# Patient Record
Sex: MALE | Race: WHITE | NOT HISPANIC OR LATINO | URBAN - METROPOLITAN AREA
[De-identification: names, ages, dates, MRNs, and addresses within clinical notes are randomized per-mention and may not be internally consistent; named-entity substitution may affect disease eponyms.]

---

## 2024-03-14 DIAGNOSIS — R56.9 SEIZURE (MULTI): Primary | ICD-10-CM

## 2024-03-14 RX ORDER — CLONAZEPAM 0.5 MG/1
0.5 TABLET ORAL NIGHTLY
Qty: 30 TABLET | Refills: 2 | Status: SHIPPED | OUTPATIENT
Start: 2024-03-14 | End: 2024-04-16

## 2024-03-14 RX ORDER — OXCARBAZEPINE 600 MG/1
600 TABLET, FILM COATED ORAL 2 TIMES DAILY
Qty: 60 TABLET | Refills: 11 | Status: SHIPPED | OUTPATIENT
Start: 2024-03-14 | End: 2024-04-16

## 2024-03-14 RX ORDER — OXCARBAZEPINE 600 MG/1
1 TABLET, FILM COATED ORAL 2 TIMES DAILY
COMMUNITY
End: 2024-03-14 | Stop reason: SDUPTHER

## 2024-03-14 RX ORDER — CLONAZEPAM 0.5 MG/1
1 TABLET ORAL NIGHTLY
COMMUNITY
Start: 2017-10-03 | End: 2024-03-14 | Stop reason: SDUPTHER

## 2024-04-14 DIAGNOSIS — R56.9 SEIZURE (MULTI): ICD-10-CM

## 2024-04-16 RX ORDER — CLONAZEPAM 0.5 MG/1
0.5 TABLET ORAL NIGHTLY
Qty: 90 TABLET | Refills: 0 | Status: SHIPPED | OUTPATIENT
Start: 2024-04-16 | End: 2024-07-15

## 2024-04-16 RX ORDER — OXCARBAZEPINE 600 MG/1
600 TABLET, FILM COATED ORAL 2 TIMES DAILY
Qty: 180 TABLET | Refills: 3 | Status: SHIPPED | OUTPATIENT
Start: 2024-04-16 | End: 2025-04-16

## 2024-07-25 ENCOUNTER — APPOINTMENT (OUTPATIENT)
Dept: SURGERY | Facility: CLINIC | Age: 44
End: 2024-07-25
Payer: COMMERCIAL

## 2024-07-25 VITALS
WEIGHT: 143 LBS | DIASTOLIC BLOOD PRESSURE: 70 MMHG | BODY MASS INDEX: 22.98 KG/M2 | HEART RATE: 76 BPM | HEIGHT: 66 IN | TEMPERATURE: 98.1 F | SYSTOLIC BLOOD PRESSURE: 117 MMHG

## 2024-07-25 DIAGNOSIS — K40.20 NON-RECURRENT BILATERAL INGUINAL HERNIA WITHOUT OBSTRUCTION OR GANGRENE: Primary | ICD-10-CM

## 2024-07-25 DIAGNOSIS — K40.20 BILATERAL INGUINAL HERNIA WITHOUT OBSTRUCTION OR GANGRENE, RECURRENCE NOT SPECIFIED: ICD-10-CM

## 2024-07-25 PROCEDURE — 99203 OFFICE O/P NEW LOW 30 MIN: CPT | Performed by: SURGERY

## 2024-07-25 PROCEDURE — 3008F BODY MASS INDEX DOCD: CPT | Performed by: SURGERY

## 2024-07-25 PROCEDURE — 1036F TOBACCO NON-USER: CPT | Performed by: SURGERY

## 2024-07-25 RX ORDER — CLONAZEPAM 0.5 MG/1
TABLET ORAL DAILY
COMMUNITY

## 2024-07-25 ASSESSMENT — PAIN SCALES - GENERAL: PAINLEVEL: 2

## 2024-07-25 NOTE — PROGRESS NOTES
"History Of Present Illness  Jesus Tafoya is a 43 y.o. male presenting with bilateral inguinal hernias.  He works in [employer].  He had previous surgery for epilepsy which has worked quite well.  He had an ablation therapy.  He had no other abdominal surgeries.  He notes the hernia is enlarging at times.        Last Recorded Vitals  Blood pressure 117/70, pulse 76, temperature 36.7 °C (98.1 °F), height 1.676 m (5' 6\"), weight 64.9 kg (143 lb).  Physical Examination  Awake and alert.  Normal respiration.  Abdominal exam no scars.  He has small bilateral inguinal hernias      Assessment/Plan bilateral inguinal hernias.  I reviewed the hernia booklets with him.  Will plan a laparoscopic bilateral inguinal hernia pair at his convenience.  Will probably plan for this in December when his season is done.    Viet Maradiaga MD FACS  Professor of Surgery  Graeme Diez Chair in Surgical [place]  [facility]  [address] [place] [address], [address]  Phone [phone]  email: [email or web address]        "

## 2024-09-16 DIAGNOSIS — R56.9 SEIZURE (MULTI): ICD-10-CM

## 2024-09-17 RX ORDER — CLONAZEPAM 0.5 MG/1
0.5 TABLET ORAL NIGHTLY
Qty: 90 TABLET | Refills: 0 | Status: SHIPPED | OUTPATIENT
Start: 2024-09-17

## 2025-01-06 ENCOUNTER — ANESTHESIA EVENT (OUTPATIENT)
Dept: OPERATING ROOM | Facility: HOSPITAL | Age: 45
End: 2025-01-06
Payer: COMMERCIAL

## 2025-01-06 NOTE — ANESTHESIA PREPROCEDURE EVALUATION
"Patient: Jesus Tafoya    Procedure Information       Date/Time: 01/07/25 0830    Procedure: Laparoscopic Bilateral  Inguinal Hernia Repair (Bilateral)    Location: [facility] A OR 09 / Virtual [facility] A OR    Surgeons: Viet Maradiaga MD                                                         Pre- Anesthesia Evaluation                                            Jesus Tafoya is a 44 y.o. male who presents for the above mentioned procedure due to Bilateral inguinal hernia without obstruction or gangrene, recurrence not specified [K40.20];Non-recurrent bilateral inguinal hernia without obstruction or gangrene [K40.20]    Past Medical History:   Diagnosis Date    Personal history of other specified conditions 05/04/2017    History of seizure     Past Surgical History:   Procedure Laterality Date    BRAIN SURGERY      CT HEAD ANGIO W AND WO IV CONTRAST  06/09/2017    CT HEAD ANGIO W AND WO IV CONTRAST 6/9/2017 [facility] ANCILLARY LEGACY     Social History   He reports that he has quit smoking. His smoking use included cigarettes. He started smoking about 20 years ago. He has a 6 pack-year smoking history. He has been exposed to tobacco smoke. He uses smokeless tobacco. He reports current alcohol use. He reports current drug use. Drug: Marijuana.    Allergies and Medications   No Known Allergies       Current Outpatient Medications   Medication Instructions    clonazePAM (KlonoPIN) 0.5 mg tablet Daily    clonazePAM (KLONOPIN) 0.5 mg, oral, Nightly    OXcarbazepine (TRILEPTAL) 600 mg, oral, 2 times daily       Recent Labs     11/06/18  1050 11/03/17  1337   ANIONGAP 10 12   BUN 12 12   CREATININE 0.91 0.87    139   K 4.2 3.9   CL 97* 102   CO2 33* 29   GLUCOSE 110* 106*       Visit Vitals  /87   Pulse 68   Temp 36.3 °C (97.3 °F) (Temporal)   Resp 15   Ht 1.676 m (5' 6\")   Wt 66.7 kg (147 lb 0.8 oz)   SpO2 98%   BMI 23.73 kg/m²   Smoking Status Former   BSA 1.76 m²     Medical Gas Therapy: None (Room air)             Relevant " Problems   Neuro   (+) Complex partial epilepsy, intractable (Multi) (Resolved)       Clinical information reviewed:   Tobacco  Allergies  Meds   Med Hx  Surg Hx   Fam Hx  Soc Hx        NPO Detail:  NPO/Void Status  Date of Last Liquid: 01/07/25  Time of Last Liquid: 0600  Date of Last Solid: 01/06/25  Time of Last Solid: 2200  Last Intake Type: Clear fluids         Physical Exam    Airway  Mallampati: II  TM distance: >3 FB  Neck ROM: full     Cardiovascular   Rhythm: regular  Rate: normal     Dental - normal exam     Pulmonary   Comments: Normal RR  Non-labored respiration    Abdominal            Anesthesia Plan    History of general anesthesia?: yes  History of complications of general anesthesia?: no    ASA 2     general   (GETA with standard ASA monitoring)  intravenous induction   Postoperative administration of opioids is intended.  Anesthetic plan and risks discussed with patient.    Plan discussed with CAA and CRNA.

## 2025-01-07 ENCOUNTER — HOSPITAL ENCOUNTER (OUTPATIENT)
Dept: CARDIOLOGY | Facility: HOSPITAL | Age: 45
Discharge: HOME | End: 2025-01-07
Payer: COMMERCIAL

## 2025-01-07 ENCOUNTER — ANESTHESIA (OUTPATIENT)
Dept: OPERATING ROOM | Facility: HOSPITAL | Age: 45
End: 2025-01-07
Payer: COMMERCIAL

## 2025-01-07 ENCOUNTER — HOSPITAL ENCOUNTER (OUTPATIENT)
Facility: HOSPITAL | Age: 45
Setting detail: OUTPATIENT SURGERY
Discharge: HOME | End: 2025-01-07
Attending: SURGERY | Admitting: SURGERY
Payer: COMMERCIAL

## 2025-01-07 VITALS
TEMPERATURE: 96.8 F | DIASTOLIC BLOOD PRESSURE: 68 MMHG | WEIGHT: 147.05 LBS | SYSTOLIC BLOOD PRESSURE: 103 MMHG | BODY MASS INDEX: 23.63 KG/M2 | OXYGEN SATURATION: 94 % | HEART RATE: 60 BPM | HEIGHT: 66 IN | RESPIRATION RATE: 11 BRPM

## 2025-01-07 DIAGNOSIS — K40.20 NON-RECURRENT BILATERAL INGUINAL HERNIA WITHOUT OBSTRUCTION OR GANGRENE: Primary | ICD-10-CM

## 2025-01-07 DIAGNOSIS — K40.20 BILATERAL INGUINAL HERNIA WITHOUT OBSTRUCTION OR GANGRENE, RECURRENCE NOT SPECIFIED: ICD-10-CM

## 2025-01-07 PROBLEM — G40.219 COMPLEX PARTIAL EPILEPSY, INTRACTABLE (MULTI): Status: RESOLVED | Noted: 2025-01-07 | Resolved: 2025-01-07

## 2025-01-07 PROBLEM — G40.219 COMPLEX PARTIAL EPILEPSY, INTRACTABLE (MULTI): Status: ACTIVE | Noted: 2025-01-07

## 2025-01-07 PROCEDURE — 2500000004 HC RX 250 GENERAL PHARMACY W/ HCPCS (ALT 636 FOR OP/ED): Performed by: ANESTHESIOLOGIST ASSISTANT

## 2025-01-07 PROCEDURE — 3700000002 HC GENERAL ANESTHESIA TIME - EACH INCREMENTAL 1 MINUTE: Performed by: SURGERY

## 2025-01-07 PROCEDURE — 3600000008 HC OR TIME - EACH INCREMENTAL 1 MINUTE - PROCEDURE LEVEL THREE: Performed by: SURGERY

## 2025-01-07 PROCEDURE — 2500000004 HC RX 250 GENERAL PHARMACY W/ HCPCS (ALT 636 FOR OP/ED): Performed by: SURGERY

## 2025-01-07 PROCEDURE — 7100000010 HC PHASE TWO TIME - EACH INCREMENTAL 1 MINUTE: Performed by: SURGERY

## 2025-01-07 PROCEDURE — 2720000007 HC OR 272 NO HCPCS: Performed by: SURGERY

## 2025-01-07 PROCEDURE — A49650 PR LAP,INGUINAL HERNIA REPR,INITIAL: Performed by: ANESTHESIOLOGIST ASSISTANT

## 2025-01-07 PROCEDURE — C1781 MESH (IMPLANTABLE): HCPCS | Performed by: SURGERY

## 2025-01-07 PROCEDURE — 7100000001 HC RECOVERY ROOM TIME - INITIAL BASE CHARGE: Performed by: SURGERY

## 2025-01-07 PROCEDURE — 2500000001 HC RX 250 WO HCPCS SELF ADMINISTERED DRUGS (ALT 637 FOR MEDICARE OP): Performed by: ANESTHESIOLOGY

## 2025-01-07 PROCEDURE — 2500000005 HC RX 250 GENERAL PHARMACY W/O HCPCS: Performed by: SURGERY

## 2025-01-07 PROCEDURE — 49650 LAP ING HERNIA REPAIR INIT: CPT | Performed by: SURGERY

## 2025-01-07 PROCEDURE — 7100000009 HC PHASE TWO TIME - INITIAL BASE CHARGE: Performed by: SURGERY

## 2025-01-07 PROCEDURE — 2500000004 HC RX 250 GENERAL PHARMACY W/ HCPCS (ALT 636 FOR OP/ED): Performed by: ANESTHESIOLOGY

## 2025-01-07 PROCEDURE — 3700000001 HC GENERAL ANESTHESIA TIME - INITIAL BASE CHARGE: Performed by: SURGERY

## 2025-01-07 PROCEDURE — 93005 ELECTROCARDIOGRAM TRACING: CPT

## 2025-01-07 PROCEDURE — 7100000002 HC RECOVERY ROOM TIME - EACH INCREMENTAL 1 MINUTE: Performed by: SURGERY

## 2025-01-07 PROCEDURE — 2500000005 HC RX 250 GENERAL PHARMACY W/O HCPCS: Performed by: ANESTHESIOLOGY

## 2025-01-07 PROCEDURE — 93010 ELECTROCARDIOGRAM REPORT: CPT | Performed by: INTERNAL MEDICINE

## 2025-01-07 PROCEDURE — 3600000003 HC OR TIME - INITIAL BASE CHARGE - PROCEDURE LEVEL THREE: Performed by: SURGERY

## 2025-01-07 PROCEDURE — A49650 PR LAP,INGUINAL HERNIA REPR,INITIAL: Performed by: ANESTHESIOLOGY

## 2025-01-07 PROCEDURE — 2780000003 HC OR 278 NO HCPCS: Performed by: SURGERY

## 2025-01-07 DEVICE — BARD MESH, 6" X 6" (15 CM X 15 CM)
Type: IMPLANTABLE DEVICE | Site: INGUINAL | Status: FUNCTIONAL
Brand: BARD

## 2025-01-07 RX ORDER — PROCHLORPERAZINE EDISYLATE 5 MG/ML
5 INJECTION INTRAMUSCULAR; INTRAVENOUS ONCE AS NEEDED
Status: DISCONTINUED | OUTPATIENT
Start: 2025-01-07 | End: 2025-01-07 | Stop reason: HOSPADM

## 2025-01-07 RX ORDER — MIDAZOLAM HYDROCHLORIDE 1 MG/ML
INJECTION INTRAMUSCULAR; INTRAVENOUS AS NEEDED
Status: DISCONTINUED | OUTPATIENT
Start: 2025-01-07 | End: 2025-01-07

## 2025-01-07 RX ORDER — OXYCODONE HYDROCHLORIDE 5 MG/1
5 TABLET ORAL
Status: DISCONTINUED | OUTPATIENT
Start: 2025-01-07 | End: 2025-01-07 | Stop reason: HOSPADM

## 2025-01-07 RX ORDER — PROPOFOL 10 MG/ML
INJECTION, EMULSION INTRAVENOUS AS NEEDED
Status: DISCONTINUED | OUTPATIENT
Start: 2025-01-07 | End: 2025-01-07

## 2025-01-07 RX ORDER — OXYCODONE HYDROCHLORIDE 5 MG/1
5 TABLET ORAL EVERY 6 HOURS PRN
Qty: 7 TABLET | Refills: 0 | Status: SHIPPED | OUTPATIENT
Start: 2025-01-07 | End: 2025-01-10

## 2025-01-07 RX ORDER — DROPERIDOL 2.5 MG/ML
0.62 INJECTION, SOLUTION INTRAMUSCULAR; INTRAVENOUS ONCE AS NEEDED
Status: DISCONTINUED | OUTPATIENT
Start: 2025-01-07 | End: 2025-01-07 | Stop reason: HOSPADM

## 2025-01-07 RX ORDER — ONDANSETRON HYDROCHLORIDE 2 MG/ML
4 INJECTION, SOLUTION INTRAVENOUS ONCE AS NEEDED
Status: DISCONTINUED | OUTPATIENT
Start: 2025-01-07 | End: 2025-01-07 | Stop reason: HOSPADM

## 2025-01-07 RX ORDER — BUPIVACAINE HYDROCHLORIDE 5 MG/ML
INJECTION, SOLUTION PERINEURAL AS NEEDED
Status: DISCONTINUED | OUTPATIENT
Start: 2025-01-07 | End: 2025-01-07 | Stop reason: HOSPADM

## 2025-01-07 RX ORDER — FENTANYL CITRATE 50 UG/ML
INJECTION, SOLUTION INTRAMUSCULAR; INTRAVENOUS AS NEEDED
Status: DISCONTINUED | OUTPATIENT
Start: 2025-01-07 | End: 2025-01-07

## 2025-01-07 RX ORDER — KETOROLAC TROMETHAMINE 30 MG/ML
INJECTION, SOLUTION INTRAMUSCULAR; INTRAVENOUS AS NEEDED
Status: DISCONTINUED | OUTPATIENT
Start: 2025-01-07 | End: 2025-01-07

## 2025-01-07 RX ORDER — SODIUM CHLORIDE, SODIUM LACTATE, POTASSIUM CHLORIDE, CALCIUM CHLORIDE 600; 310; 30; 20 MG/100ML; MG/100ML; MG/100ML; MG/100ML
100 INJECTION, SOLUTION INTRAVENOUS CONTINUOUS
Status: DISCONTINUED | OUTPATIENT
Start: 2025-01-07 | End: 2025-01-07 | Stop reason: HOSPADM

## 2025-01-07 RX ORDER — HYDRALAZINE HYDROCHLORIDE 20 MG/ML
5 INJECTION INTRAMUSCULAR; INTRAVENOUS EVERY 30 MIN PRN
Status: DISCONTINUED | OUTPATIENT
Start: 2025-01-07 | End: 2025-01-07 | Stop reason: HOSPADM

## 2025-01-07 RX ORDER — ROCURONIUM BROMIDE 10 MG/ML
INJECTION, SOLUTION INTRAVENOUS AS NEEDED
Status: DISCONTINUED | OUTPATIENT
Start: 2025-01-07 | End: 2025-01-07

## 2025-01-07 RX ORDER — LIDOCAINE HYDROCHLORIDE 20 MG/ML
INJECTION, SOLUTION INFILTRATION; PERINEURAL AS NEEDED
Status: DISCONTINUED | OUTPATIENT
Start: 2025-01-07 | End: 2025-01-07

## 2025-01-07 RX ORDER — SODIUM CHLORIDE 0.9 G/100ML
IRRIGANT IRRIGATION AS NEEDED
Status: DISCONTINUED | OUTPATIENT
Start: 2025-01-07 | End: 2025-01-07 | Stop reason: HOSPADM

## 2025-01-07 RX ORDER — ONDANSETRON HYDROCHLORIDE 2 MG/ML
INJECTION, SOLUTION INTRAVENOUS AS NEEDED
Status: DISCONTINUED | OUTPATIENT
Start: 2025-01-07 | End: 2025-01-07

## 2025-01-07 RX ORDER — CEFAZOLIN 1 G/1
INJECTION, POWDER, FOR SOLUTION INTRAVENOUS AS NEEDED
Status: DISCONTINUED | OUTPATIENT
Start: 2025-01-07 | End: 2025-01-07

## 2025-01-07 RX ADMIN — LIDOCAINE HYDROCHLORIDE 100 MG: 20 INJECTION, SOLUTION INFILTRATION; PERINEURAL at 08:29

## 2025-01-07 RX ADMIN — OXYCODONE HYDROCHLORIDE 5 MG: 5 TABLET ORAL at 10:00

## 2025-01-07 RX ADMIN — Medication 6 L/MIN: at 09:23

## 2025-01-07 RX ADMIN — MIDAZOLAM HYDROCHLORIDE 2 MG: 1 INJECTION, SOLUTION INTRAMUSCULAR; INTRAVENOUS at 08:24

## 2025-01-07 RX ADMIN — DEXAMETHASONE SODIUM PHOSPHATE 8 MG: 4 INJECTION, SOLUTION INTRAMUSCULAR; INTRAVENOUS at 08:39

## 2025-01-07 RX ADMIN — FENTANYL CITRATE 50 MCG: 50 INJECTION, SOLUTION INTRAMUSCULAR; INTRAVENOUS at 08:36

## 2025-01-07 RX ADMIN — SODIUM CHLORIDE, POTASSIUM CHLORIDE, SODIUM LACTATE AND CALCIUM CHLORIDE: 600; 310; 30; 20 INJECTION, SOLUTION INTRAVENOUS at 08:20

## 2025-01-07 RX ADMIN — CEFAZOLIN 2 G: 1 INJECTION, POWDER, FOR SOLUTION INTRAMUSCULAR; INTRAVENOUS at 08:34

## 2025-01-07 RX ADMIN — HYDROMORPHONE HYDROCHLORIDE 0.5 MG: 1 INJECTION, SOLUTION INTRAMUSCULAR; INTRAVENOUS; SUBCUTANEOUS at 09:38

## 2025-01-07 RX ADMIN — ROCURONIUM BROMIDE 40 MG: 10 INJECTION, SOLUTION INTRAVENOUS at 08:30

## 2025-01-07 RX ADMIN — KETOROLAC TROMETHAMINE 30 MG: 30 INJECTION, SOLUTION INTRAMUSCULAR at 09:14

## 2025-01-07 RX ADMIN — FENTANYL CITRATE 50 MCG: 50 INJECTION, SOLUTION INTRAMUSCULAR; INTRAVENOUS at 08:29

## 2025-01-07 RX ADMIN — PROPOFOL 200 MG: 10 INJECTION, EMULSION INTRAVENOUS at 08:29

## 2025-01-07 RX ADMIN — ONDANSETRON 4 MG: 2 INJECTION, SOLUTION INTRAMUSCULAR; INTRAVENOUS at 09:14

## 2025-01-07 ASSESSMENT — PAIN - FUNCTIONAL ASSESSMENT
PAIN_FUNCTIONAL_ASSESSMENT: 0-10
PAIN_FUNCTIONAL_ASSESSMENT: UNABLE TO SELF-REPORT
PAIN_FUNCTIONAL_ASSESSMENT: 0-10
PAIN_FUNCTIONAL_ASSESSMENT: 0-10

## 2025-01-07 ASSESSMENT — PAIN SCALES - GENERAL
PAINLEVEL_OUTOF10: 0 - NO PAIN
PAINLEVEL_OUTOF10: 7
PAINLEVEL_OUTOF10: 3
PAINLEVEL_OUTOF10: 0 - NO PAIN

## 2025-01-07 ASSESSMENT — COLUMBIA-SUICIDE SEVERITY RATING SCALE - C-SSRS
6. HAVE YOU EVER DONE ANYTHING, STARTED TO DO ANYTHING, OR PREPARED TO DO ANYTHING TO END YOUR LIFE?: NO
2. HAVE YOU ACTUALLY HAD ANY THOUGHTS OF KILLING YOURSELF?: NO
1. IN THE PAST MONTH, HAVE YOU WISHED YOU WERE DEAD OR WISHED YOU COULD GO TO SLEEP AND NOT WAKE UP?: NO

## 2025-01-07 ASSESSMENT — PAIN DESCRIPTION - DESCRIPTORS
DESCRIPTORS: SORE
DESCRIPTORS: SORE

## 2025-01-07 NOTE — ANESTHESIA PROCEDURE NOTES
Airway  Date/Time: 1/7/2025 8:32 AM  Urgency: elective    Airway not difficult    Staffing  Performed: WILDA   Authorized by: Lenka Last MD    Performed by: WILDA Davis  Patient location during procedure: OR    Indications and Patient Condition  Indications for airway management: anesthesia  Spontaneous ventilation: present  Sedation level: deep  Preoxygenated: yes  Patient position: sniffing  Mask difficulty assessment: 1 - vent by mask    Final Airway Details  Final airway type: endotracheal airway      Successful airway: ETT  Cuffed: yes   Successful intubation technique: direct laryngoscopy  Facilitating devices/methods: intubating stylet and cricoid pressure  Endotracheal tube insertion site: oral  Blade: Chelsie  Blade size: #4  ETT size (mm): 7.5  Cormack-Lehane Classification: grade IIa - partial view of glottis  Placement verified by: chest auscultation and capnometry   Measured from: teeth  ETT to teeth (cm): 23  Number of attempts at approach: 1

## 2025-01-07 NOTE — BRIEF OP NOTE
Date: 2025  OR Location: [facility] OR    Name: Jesus Tafoya, : 1980, Age: 44 y.o., MRN: [id number], Sex: male    Diagnosis  Pre-op Diagnosis      * Bilateral inguinal hernia without obstruction or gangrene, recurrence not specified [K40.20] Post-op Diagnosis     * Bilateral inguinal hernia without obstruction or gangrene, recurrence not specified [K40.20]     Procedures  Laparoscopic Bilateral  Inguinal Hernia Repair  93068 - OR LAPAROSCOPY SURG RPR INITIAL INGUINAL HERNIA      Surgeons      * Viet Maradiaga - Primary    Resident/Fellow/Other Assistant:  Surgeons and Role:  * No surgeons found with a matching role *    Staff:   Circulator: Latasha  Scrub Person: Shawn Sweeney Scrub: Heidy Sweeney Circulator: Suellen    Anesthesia Staff: Anesthesiologist: Lenka Last MD  C-AA: WILDA Davis    Procedure Summary  Anesthesia: General  ASA: II  Estimated Blood Loss: 2mL  Intra-op Medications:   Administrations occurring from 0830 to 1000 on 25:   Medication Name Total Dose   BUPivacaine HCl (Marcaine) 0.5 % (5 mg/mL) injection 13 mL   sodium chloride 0.9 % irrigation solution 1,000 mL   ceFAZolin (Ancef) vial 1 g 2 g   dexAMETHasone (Decadron) injection 4 mg/mL 8 mg   fentaNYL (Sublimaze) injection 50 mcg/mL 50 mcg   ketorolac (Toradol) injection 30 mg 30 mg   ondansetron (Zofran) 2 mg/mL injection 4 mg   rocuronium (ZeMuron) 50 mg/5 mL injection 40 mg              Anesthesia Record               Intraprocedure I/O Totals       None           Specimen: No specimens collected               Findings: bilateral indirect inguinal hernia     Complications:  None; patient tolerated the procedure well.     Disposition: PACU - hemodynamically stable.  Condition: stable  Specimens Collected: No specimens collected  Attending Attestation: I was present and scrubbed for the entire procedure.    Viet Maradiaga  Phone Number: [phone]

## 2025-01-07 NOTE — ANESTHESIA POSTPROCEDURE EVALUATION
Patient: Jesus Tafoya    Procedure Summary       Date: 01/07/25 Room / Location: [facility] A OR 09 / Virtual [facility] OR    Anesthesia Start: 0820 Anesthesia Stop: 0928    Procedure: Laparoscopic Bilateral  Inguinal Hernia Repair (Bilateral) Diagnosis:       Bilateral inguinal hernia without obstruction or gangrene, recurrence not specified      (Bilateral inguinal hernia without obstruction or gangrene, recurrence not specified [K40.20])    Surgeons: Viet Maradiaga MD Responsible Provider: Lenka Last MD    Anesthesia Type: general ASA Status: 2            Anesthesia Type: general    Vitals Value Taken Time   /63 01/07/25 0930   Temp 36 °C (96.8 °F) 01/07/25 0923   Pulse 80 01/07/25 0930   Resp 13 01/07/25 0930   SpO2 96 % 01/07/25 0930       Anesthesia Post Evaluation    Patient location during evaluation: PACU  Patient participation: complete - patient participated  Level of consciousness: awake  Pain management: adequate  Multimodal analgesia pain management approach  Airway patency: patent  Cardiovascular status: hemodynamically stable  Respiratory status: spontaneous ventilation  Hydration status: euvolemic  Postoperative Nausea and Vomiting: none        No notable events documented.

## 2025-01-07 NOTE — OP NOTE
Laparoscopic Bilateral  Inguinal Hernia Repair (B) Operative Note     Date: 2025  OR Location: [facility] A OR    Name: Jesus Tafoya, : 1980, Age: 44 y.o., MRN: [id number], Sex: male    Diagnosis  Pre-op Diagnosis      * Bilateral inguinal hernia without obstruction or gangrene, recurrence not specified [K40.20] Post-op Diagnosis     * Bilateral inguinal hernia without obstruction or gangrene, recurrence not specified [K40.20]     Procedures  Laparoscopic Bilateral  Inguinal Hernia Repair  83015 - CT LAPAROSCOPY SURG RPR INITIAL INGUINAL HERNIA      Surgeons      * [facility]    Resident/Fellow/Other Assistant:  Surgeons and Role:  * No surgeons found with a matching role *    Staff:   Circulator: Latasha Tinocoub Person: Shawn Sweeney Scrub: Heidy Sweeney Circulator: Suellen    Anesthesia Staff: Anesthesiologist: Lenka Last MD  C-AA: WILDA Davis    Procedure Summary  Anesthesia: General  ASA: II  Estimated Blood Loss: 5mL  Intra-op Medications:   Administrations occurring from 0830 to 1000 on 25:   Medication Name Total Dose   BUPivacaine HCl (Marcaine) 0.5 % (5 mg/mL) injection 13 mL   sodium chloride 0.9 % irrigation solution 1,000 mL   oxygen (O2) therapy 36 L   ceFAZolin (Ancef) vial 1 g 2 g   dexAMETHasone (Decadron) injection 4 mg/mL 8 mg   fentaNYL (Sublimaze) injection 50 mcg/mL 50 mcg   ketorolac (Toradol) injection 30 mg 30 mg   LR bolus Cannot be calculated   ondansetron (Zofran) 2 mg/mL injection 4 mg   rocuronium (ZeMuron) 50 mg/5 mL injection 40 mg              Anesthesia Record               Intraprocedure I/O Totals          Intake    LR bolus 750.00 mL    Total Intake 750 mL          Specimen: No specimens collected              Drains and/or Catheters: * None in log *    Tourniquet Times:         Implants:  Implants       Type Name Action Serial No.      Surgical Mesh Sling Implant PATCH, MESH, MARLEX, 6 X 6 IN, POLYPROPYLENE - [id number] Implanted                Findings: bilateral inguinal hernias. Indirect on left and direct on right    Indications: Jesus Tafoya is an 44 y.o. male who is having surgery for Bilateral inguinal hernia without obstruction or gangrene, recurrence not specified [K40.20].     The patient was seen in the preoperative area. The risks, benefits, complications, treatment options, non-operative alternatives, expected recovery and outcomes were discussed with the patient. The possibilities of reaction to medication, pulmonary aspiration, injury to surrounding structures, bleeding, recurrent infection, the need for additional procedures, failure to diagnose a condition, and creating a complication requiring transfusion or operation were discussed with the patient. The patient concurred with the proposed plan, giving informed consent.  The site of surgery was properly noted/marked if necessary per policy. The patient has been actively warmed in preoperative area.  Venous thrombosis prophylaxis have been ordered including bilateral sequential compression devices    Procedure Details: Patient brought to the op room.  General anesthesia was given.  Abdomen is prepped and draped.  Vertical umbilical incision made.  Anterior rectus fascia was opened on the right.  Rectus muscle retracted.  Balloon dissector placed on pubic tubercle.  After this was inflated the structural balloon was replaced.  Two 5 mm trocars were placed midline.  Began dissecting the left.  He had indirect inguinal hernia.  This reduced.  Cord structures were peritonealized.  None 3 x 5 and half piece of mesh was used to cover the entire area.  Is fixated in 3 spots with absorbable tacker.  I turned my attention to the right side.  Patient had a direct inguinal hernia on the right.  This reduced cord structures peritoneal lysed a mirror-image piece of mesh was placed and fixated in the same way.  I slowly desufflated properitoneal space.  Peritoneum came on top of the mesh very  nicely.  Closed my fascial defect at the umbilicus with 0 Vicryl.  The skin incision with 4-0 Vicryl.  Complications:  None; patient tolerated the procedure well.    Disposition: PACU - hemodynamically stable.  Condition: stable       Attending Attestation: I was present and scrubbed for the entire procedure.    Viet Maradiaga  Phone Number: [phone]

## 2025-01-07 NOTE — H&P
"History Of Present Illness  Jesus Tafoya is a 44 y.o. male presenting with bilateral inguinal hernias.  History of epilepsy treated in past.     Past Medical History  He has a past medical history of Personal history of other specified conditions (05/04/2017).    Surgical History  He has a past surgical history that includes CT angio head w and wo IV contrast (06/09/2017) and Brain surgery.     Social History  He reports that he has quit smoking. His smoking use included cigarettes. He started smoking about 20 years ago. He has a 6 pack-year smoking history. He has been exposed to tobacco smoke. He uses smokeless tobacco. He reports current alcohol use. He reports current drug use. Drug: Marijuana.    Family History  No family history on file.     Allergies  Patient has no known allergies.       Physical Exam  Awake and alert. Normal respiration  Abdomen benign. No scars.      Last Recorded Vitals  Blood pressure 116/87, pulse 68, temperature 36.3 °C (97.3 °F), temperature source Temporal, resp. rate 15, height 1.676 m (5' 6\"), weight 66.7 kg (147 lb 0.8 oz), SpO2 98%.       Assessment/Plan   Assessment & Plan      Bilateral inguinal hernias  Plan on laparoscopic repair     nt.      Viet Maradiaga MD    "

## 2025-01-21 ENCOUNTER — APPOINTMENT (OUTPATIENT)
Dept: SURGERY | Facility: CLINIC | Age: 45
End: 2025-01-21
Payer: COMMERCIAL

## 2025-01-21 VITALS
RESPIRATION RATE: 18 BRPM | HEART RATE: 73 BPM | BODY MASS INDEX: 23.3 KG/M2 | SYSTOLIC BLOOD PRESSURE: 135 MMHG | HEIGHT: 66 IN | WEIGHT: 145 LBS | DIASTOLIC BLOOD PRESSURE: 83 MMHG | TEMPERATURE: 97.9 F

## 2025-01-21 DIAGNOSIS — K40.20 NON-RECURRENT BILATERAL INGUINAL HERNIA WITHOUT OBSTRUCTION OR GANGRENE: Primary | ICD-10-CM

## 2025-01-21 PROCEDURE — 3008F BODY MASS INDEX DOCD: CPT | Performed by: SURGERY

## 2025-01-21 PROCEDURE — 99211 OFF/OP EST MAY X REQ PHY/QHP: CPT | Performed by: SURGERY

## 2025-01-21 ASSESSMENT — PAIN SCALES - GENERAL: PAINLEVEL_OUTOF10: 0-NO PAIN

## 2025-01-21 NOTE — PROGRESS NOTES
"History Of Present Illness  Jesus Tafoya is a 44 y.o. male presenting status post laparoscopic bilateral inguinal hernias.  He is doing well.  Very little bruising.      Last Recorded Vitals  Blood pressure 135/83, pulse 73, temperature 36.6 °C (97.9 °F), resp. rate 18, height 1.676 m (5' 6\"), weight 65.8 kg (145 lb).  Physical Exam removed his Steri-Strips.  Incisions are well-healed.  No evidence of any hernia recurrences      Assessment/Plan   He is done well from his laparoscopic bilateral inguinal hernia repair.  No limitations.  He can follow-up with me as needed.    Viet Maradiaga MD FACS  Professor of Surgery  Graeme Diez Chair in Surgical [place]  [facility]  [address] [place] [address], [address]  Phone [phone]  email: [email or web address]        "

## 2025-01-23 LAB
ATRIAL RATE: 67 BPM
P AXIS: 75 DEGREES
P OFFSET: 199 MS
P ONSET: 143 MS
PR INTERVAL: 154 MS
Q ONSET: 220 MS
QRS COUNT: 11 BEATS
QRS DURATION: 82 MS
QT INTERVAL: 390 MS
QTC CALCULATION(BAZETT): 412 MS
QTC FREDERICIA: 404 MS
R AXIS: 55 DEGREES
T AXIS: 50 DEGREES
T OFFSET: 415 MS
VENTRICULAR RATE: 67 BPM

## 2025-02-20 DIAGNOSIS — R56.9 SEIZURE (MULTI): ICD-10-CM

## 2025-02-21 RX ORDER — CLONAZEPAM 0.5 MG/1
0.5 TABLET ORAL NIGHTLY
Qty: 30 TABLET | Refills: 0 | Status: SHIPPED | OUTPATIENT
Start: 2025-02-21 | End: 2025-03-23

## 2025-04-02 ENCOUNTER — TELEMEDICINE (OUTPATIENT)
Dept: NEUROLOGY | Facility: CLINIC | Age: 45
End: 2025-04-02
Payer: COMMERCIAL

## 2025-04-02 DIAGNOSIS — R56.9 SEIZURE (MULTI): ICD-10-CM

## 2025-04-02 RX ORDER — CLONAZEPAM 0.5 MG/1
0.5 TABLET ORAL NIGHTLY
Qty: 30 TABLET | Refills: 2 | Status: SHIPPED | OUTPATIENT
Start: 2025-04-02 | End: 2025-07-01

## 2025-04-02 RX ORDER — OXCARBAZEPINE 600 MG/1
600 TABLET, FILM COATED ORAL 2 TIMES DAILY
Qty: 180 TABLET | Refills: 3 | Status: SHIPPED | OUTPATIENT
Start: 2025-04-02 | End: 2026-04-02

## 2025-04-02 NOTE — PROGRESS NOTES
[facility]   Epilepsy    Virtual or Telephone Consent    While technically available, the patient was unable or unwilling to consent to connect via audio/video telehealth technology; therefore, I performed this visit using a real-time audio only connection between Jesus Tafoya & HIMANSHU Kan.  Verbal consent was requested and obtained from Jesus Tafoya on this date, 04/02/25 for a telehealth visit and the patient's location was confirmed at the time of the visit.     Patient ID: Jesus Tafoya 44 y.o.male presenting in follow-up for previously diagnosed epilepsy  Patient of: Dr. Vik Matthews    HPI    Type: EPE  Semiology: Tonic->Automotor sz  Freq: None since 2017  EZ: Left orbitofrontal  Etiology: Suspected FCD  RMC: None  Current AEDs: OXC 600mg BID, Clonazepam 0.5mg QHS     left orbitofrontal epilepsy s/p ablation in 2017,         PRESENT CONCERNS:    Remains seizure free since 2017 . No issues with current meds     Review of Systems  All other systems reviewed and negative unless otherwise stated above    CONTROLLED SUBSTANCE  OARRS:  No data recorded  I have personally reviewed the OARRS report for Jesus Tafoya. I have considered the risks of abuse, dependence, addiction and diversion and I believe that it is clinically appropriate for Jesus Tafoya to be prescribed this medication    Is the patient prescribed a combination of a benzodiazepine and opioid?  No    Last Urine Drug Screen / ordered today: No  No results found for this or any previous visit (from the past 8760 hours).    Clinical rationale for not completing a Urine Drug Screen: Patient prescribed controlled substance for management of seizure, epilepsy, movement disorder        Vitals:  There were no vitals filed for this visit.    PHYSICAL EXAM:  This examination was performed over telehealth by telephone discussion--Patient is alert and oriented. Speech is fluid, without dysarthria. Able to appropriately give  information about past history and current events. Further objective neurological testing not possible given nature of phone interview.        ASSESSMENT & PLAN:   44 y.o. male presenting in follow-up for previously diagnosed epilepsy    Problem List Items Addressed This Visit    None  Visit Diagnoses       Seizure (Multi)              Seizure free since ablation in 2017     Continue -600 and clonzaepam 0.5 at night   RTC 24 months   because you have not lost awareness you are not under restrictions at this time  Next driving form due in 2028- will sign off   Call me with any seizures prior to your next appointment   Given my contact information/instructions for Kelley

## 2025-05-27 DIAGNOSIS — R56.9 SEIZURE (MULTI): ICD-10-CM

## 2025-05-27 RX ORDER — OXCARBAZEPINE 600 MG/1
600 TABLET, FILM COATED ORAL 2 TIMES DAILY
Qty: 180 TABLET | Refills: 3 | Status: SHIPPED | OUTPATIENT
Start: 2025-05-27

## (undated) DEVICE — SOLUTION, INJECTION, USP, SODIUM CHLORIDE 0.9%, .9, NACL, 1000 ML, BAG

## (undated) DEVICE — ABSORBATACK, 5MM, SINGLE USE/W 15 ABSORBABLE TACKS

## (undated) DEVICE — SCOPE WARMER, LAPAROSCOPE, BAG ONLY, LF

## (undated) DEVICE — CORD, MONOPOLAR HIGH FREQUENCY, 8MM PLUG, 300CM

## (undated) DEVICE — ACCESS SYS, KII SHIELDED BLADED, Z-THREAD, 5X100CM

## (undated) DEVICE — SLEEVE, KII, Z-THREAD, 5X100CM

## (undated) DEVICE — TROCAR, STRUCTURAL BALLOON, SPACEMAKER

## (undated) DEVICE — BALLOON, PREPERITONEAL, DISSECTOR, KIDNEY, SPACEMAKER

## (undated) DEVICE — Device

## (undated) DEVICE — TUBE SET, PNEUMOLAR HEATED, SMOKE EVACU, HIGH-FLOW

## (undated) DEVICE — SUTURE, VICRYL, 0, 27 IN, UR-6, VIOLET

## (undated) DEVICE — SUTURE, VICRYL PLUS, 4-0, 27 IN, PS-2

## (undated) DEVICE — STRIP, SKIN CLOSURE, STERI STRIP, REINFORCED, 0.5 X 4 IN